# Patient Record
Sex: FEMALE | Race: WHITE | NOT HISPANIC OR LATINO | Employment: OTHER | URBAN - METROPOLITAN AREA
[De-identification: names, ages, dates, MRNs, and addresses within clinical notes are randomized per-mention and may not be internally consistent; named-entity substitution may affect disease eponyms.]

---

## 2020-12-15 ENCOUNTER — HOSPITAL ENCOUNTER (EMERGENCY)
Facility: CLINIC | Age: 65
Discharge: HOME | End: 2020-12-15
Attending: PHYSICIAN ASSISTANT | Admitting: PHYSICIAN ASSISTANT
Payer: COMMERCIAL

## 2020-12-15 ENCOUNTER — APPOINTMENT (OUTPATIENT)
Dept: ULTRASOUND IMAGING | Facility: CLINIC | Age: 65
End: 2020-12-15
Attending: PHYSICIAN ASSISTANT
Payer: COMMERCIAL

## 2020-12-15 VITALS
TEMPERATURE: 98.8 F | RESPIRATION RATE: 16 BRPM | DIASTOLIC BLOOD PRESSURE: 84 MMHG | HEIGHT: 62 IN | OXYGEN SATURATION: 99 % | WEIGHT: 156.53 LBS | SYSTOLIC BLOOD PRESSURE: 148 MMHG | BODY MASS INDEX: 28.8 KG/M2 | HEART RATE: 72 BPM

## 2020-12-15 DIAGNOSIS — L03.114 CELLULITIS OF LEFT UPPER EXTREMITY: ICD-10-CM

## 2020-12-15 PROCEDURE — 250N000013 HC RX MED GY IP 250 OP 250 PS 637: Performed by: PHYSICIAN ASSISTANT

## 2020-12-15 PROCEDURE — 99284 EMERGENCY DEPT VISIT MOD MDM: CPT | Mod: 25

## 2020-12-15 PROCEDURE — 93971 EXTREMITY STUDY: CPT | Mod: LT

## 2020-12-15 RX ORDER — CEPHALEXIN 500 MG/1
500 CAPSULE ORAL ONCE
Status: COMPLETED | OUTPATIENT
Start: 2020-12-15 | End: 2020-12-15

## 2020-12-15 RX ORDER — CEPHALEXIN 500 MG/1
500 CAPSULE ORAL 4 TIMES DAILY
Qty: 28 CAPSULE | Refills: 0 | Status: SHIPPED | OUTPATIENT
Start: 2020-12-15 | End: 2020-12-22

## 2020-12-15 RX ADMIN — CEPHALEXIN 500 MG: 500 CAPSULE ORAL at 14:57

## 2020-12-15 ASSESSMENT — ENCOUNTER SYMPTOMS: COLOR CHANGE: 1

## 2020-12-15 ASSESSMENT — MIFFLIN-ST. JEOR: SCORE: 1218.25

## 2020-12-15 NOTE — ED TRIAGE NOTES
Pt arrives with red bisi on R arm with red streak moving to interior bicep. Noticed it this morning. States feels warm and is swollen. ABCs intact.

## 2020-12-15 NOTE — ED AVS SNAPSHOT
[facility] Emergency Dept  [address]  [facility] [address]  Phone: [phone]  Fax: [phone]                                    Cher Pugh   MRN: [id number]    Department: [facility] Emergency Dept   Date of Visit: 12/15/2020           After Visit Summary Signature Page    I have received my discharge instructions, and my questions have been answered. I have discussed any challenges I see with this plan with the nurse or doctor.    ..........................................................................................................................................  Patient/Patient Representative Signature      ..........................................................................................................................................  Patient Representative Print Name and Relationship to Patient    ..................................................               ................................................  Date                                   Time    ..........................................................................................................................................  Reviewed by Signature/Title    ...................................................              ..............................................  Date                                               Time          22EPIC Rev 08/18

## 2020-12-15 NOTE — ED PROVIDER NOTES
"  History   Chief Complaint:  Wound Check    HPI   Cher Puhg is a 65 year old female who presents with a right arm wound check. The patient reports that this morning she noticed a red streak on her right forearm and later noticed a streaking red line.  It feels warm and swollen. No recent trauma or injuries reported. No recent IV placement. No recent cat or other bites.     Medications:    Estradiol    Past Medical History:    Patient denies any medical problems    Past surgical History:   Cholecystectomy   Cyst removal     Social History:  Presents to the ED alone     Review of Systems   Skin: Positive for color change.   All other systems reviewed and are negative.      Physical Exam     Patient Vitals for the past 24 hrs:   BP Temp Temp src Pulse Resp SpO2 Height Weight   12/15/20 1444 (!) 148/84 -- -- 72 -- -- -- --   12/15/20 1255 (!) 191/108 98.8  F (37.1  C) Oral 82 18 99 % 1.575 m (5' 2\") 71 kg (156 lb 8.4 oz)     Physical Exam  Constitutional: no distress.   CV: 2+ radial pulse, brisk distal cap refill  Pulm: No respiratory distress  MSK: Full ROM of right upper extremity without pain  Neurological: Alert, attentive  5/5  strength; sensation intact to RUE.   Skin: Skin is warm and dry. 4x2 cm area of erythema to the left forearm with a streaking erythematous line to the elbow as noted below.   Psych: Normal mood and affect               Emergency Department Course   Imaging:  US Upper Extremity Venous Duplex Left  No evidence of deep venous thrombosis.  Reading per radiology    Emergency Department Course:    Reviewed:  1256: I reviewed the patient's nursing notes, vitals, past medical records, Care Everywhere.     Assessments:  1301: I performed an exam of the patient as documented above.     Interventions:  1457: Keflex 500 mg Oral    Disposition:  The patient was discharged to home.     Impression & Plan   Medical Decision Making:  Cher Pugh is a 65 year old female who presents for " evaluation of skin redness as noted above.  No recent trauma, laceration, or other injury to the area.  There is no open wound.  Ultrasound of the left upper extremity unremarkable with no evidence of DVT.  Unclear source of erythema at this time, though given the area of redness and streaking redness, concern for cellulitis and lymphangitis.  We will treat with Keflex and area of redness outlined.   Close follow-up with her primary care provider.  I recommended wound recheck in 2 days.  She understands reasons to return including fevers, spreading redness, increasing pain, or any other concerning symptoms develop.  All questions and concerns addressed prior to discharge home.    Diagnosis:    ICD-10-CM    1. Cellulitis of left upper extremity  L03.114      Discharge Medications:  New Prescriptions    CEPHALEXIN (KEFLEX) 500 MG CAPSULE    Take 1 capsule (500 mg) by mouth 4 times daily for 7 days     Scribe Disclosure:  Michael CASILLAS, am serving as a scribe at 1:15 PM on 12/15/2020 to document services personally performed by Miroslava Garza PA-C based on my observations and the provider's statements to me.     [facility] EMERGENCY DEPT       Miroslava Garza PA-C  12/15/20 8406

## 2020-12-15 NOTE — DISCHARGE INSTRUCTIONS
*Elevate your arm as much as possible.  *Take medications as prescribed. Cephalexin as prescribed.    *Follow-up with your doctor for a recheck in the next 2 days.   *Return if you develop fever, rapid spread or pain/redness/warmth, worsening pain, faint or feel like you will faint or become worse in any way.      Discharge Instructions  Cellulitis    Cellulitis is an infection of the skin that occurs when bacteria enter the skin.   Symptoms are generally redness, swelling, warmth and pain.  Your infection appeared to be appropriate to treat at home with antibiotics.  However, sometimes your infection may be worse than it seemed at first, or may worsen with time. If you have new or worse symptoms, you may need to be seen again in the Emergency Department or by your primary doctor.    Return to the Emergency Department if:  The redness, pain, or swelling gets a lot worse.  If the red area was marked, return if it is red beyond the marked area.  You are unable to get your antibiotics, or are vomiting them up or you can t take them.  You are feeling more ill, weak or lightheaded.  You start to run a new fever (temperature >101).  Anything else about the infection worries or concerns you.  Treatment:  Start your antibiotics right away, and take them as prescribed. Be sure to finish the whole prescription, even if you are better.  Apply a heating pad, warm packs, or warm water soaks to the infected area for 15 minutes at a time, at least 3 times a day. Do not use a heating pad on your feet or legs if you have diabetes. Do not sleep with a heating pad on, since this can cause burns or skin injury.  Rest your injured area for at least 1-2 days. After that you may start using your extremity again as long as there is not too much pain.   Raise the injured area above the level of your heart as much as possible in the first 1-2 days.  Tylenol  (acetaminophen), Motrin  (ibuprofen), or Advil  (ibuprofen) may help may help reduce  "pain and fever and may help you feel more comfortable. Be sure to read and follow the package directions, and ask your doctor if you have questions.    Follow-up with your doctor:  Re-check in clinic within 2-3 days.  Probiotics: If you have been given an antibiotic, you may want to also take a probiotic pill or eat yogurt with live cultures. Probiotics have \"good bacteria\" to help your intestines stay healthy. Studies have shown that probiotics help prevent diarrhea and other intestine problems (including C. diff infection) when you take antibiotics. You can buy these without a prescription in the pharmacy section of the store.     If you were given a prescription for medicine here today, be sure to read all of the information (including the package insert) that comes with your prescription.  This will include important information about the medicine, its side effects, and any warnings that you need to know about.  The pharmacist who fills the prescription can provide more information and answer questions you may have about the medicine.  If you have questions or concerns that the pharmacist cannot address, please call or return to the Emergency Department.   "

## 2024-03-02 ENCOUNTER — HOSPITAL ENCOUNTER (EMERGENCY)
Facility: CLINIC | Age: 69
Discharge: HOME | End: 2024-03-02
Attending: EMERGENCY MEDICINE | Admitting: EMERGENCY MEDICINE
Payer: MEDICARE

## 2024-03-02 ENCOUNTER — APPOINTMENT (OUTPATIENT)
Dept: CT IMAGING | Facility: CLINIC | Age: 69
End: 2024-03-02
Attending: EMERGENCY MEDICINE
Payer: MEDICARE

## 2024-03-02 ENCOUNTER — APPOINTMENT (OUTPATIENT)
Dept: MRI IMAGING | Facility: CLINIC | Age: 69
End: 2024-03-02
Attending: EMERGENCY MEDICINE
Payer: MEDICARE

## 2024-03-02 ENCOUNTER — APPOINTMENT (OUTPATIENT)
Dept: GENERAL RADIOLOGY | Facility: CLINIC | Age: 69
End: 2024-03-02
Attending: EMERGENCY MEDICINE
Payer: MEDICARE

## 2024-03-02 VITALS
TEMPERATURE: 98.7 F | BODY MASS INDEX: 29.49 KG/M2 | RESPIRATION RATE: 18 BRPM | DIASTOLIC BLOOD PRESSURE: 82 MMHG | HEIGHT: 62 IN | WEIGHT: 160.27 LBS | HEART RATE: 80 BPM | OXYGEN SATURATION: 97 % | SYSTOLIC BLOOD PRESSURE: 144 MMHG

## 2024-03-02 DIAGNOSIS — R03.0 ELEVATED BLOOD PRESSURE READING: ICD-10-CM

## 2024-03-02 DIAGNOSIS — R91.8 PULMONARY NODULES: ICD-10-CM

## 2024-03-02 DIAGNOSIS — R07.9 CHEST PAIN, UNSPECIFIED TYPE: ICD-10-CM

## 2024-03-02 DIAGNOSIS — R20.2 PARESTHESIA: ICD-10-CM

## 2024-03-02 LAB
ANION GAP SERPL CALCULATED.3IONS-SCNC: 13 MMOL/L (ref 7–15)
BASOPHILS # BLD AUTO: 0.1 10E3/UL (ref 0–0.2)
BASOPHILS NFR BLD AUTO: 1 %
BUN SERPL-MCNC: 13.4 MG/DL (ref 8–23)
CALCIUM SERPL-MCNC: 9.5 MG/DL (ref 8.8–10.2)
CHLORIDE SERPL-SCNC: 103 MMOL/L (ref 98–107)
CREAT SERPL-MCNC: 0.74 MG/DL (ref 0.51–0.95)
D DIMER PPP FEU-MCNC: <0.27 UG/ML FEU (ref 0–0.5)
DEPRECATED HCO3 PLAS-SCNC: 24 MMOL/L (ref 22–29)
EGFRCR SERPLBLD CKD-EPI 2021: 89 ML/MIN/1.73M2
EOSINOPHIL # BLD AUTO: 0 10E3/UL (ref 0–0.7)
EOSINOPHIL NFR BLD AUTO: 0 %
ERYTHROCYTE [DISTWIDTH] IN BLOOD BY AUTOMATED COUNT: 13 % (ref 10–15)
GLUCOSE SERPL-MCNC: 109 MG/DL (ref 70–99)
HCT VFR BLD AUTO: 41 % (ref 35–47)
HGB BLD-MCNC: 13.6 G/DL (ref 11.7–15.7)
HOLD SPECIMEN: NORMAL
HOLD SPECIMEN: NORMAL
IMM GRANULOCYTES # BLD: 0 10E3/UL
IMM GRANULOCYTES NFR BLD: 0 %
LYMPHOCYTES # BLD AUTO: 1.9 10E3/UL (ref 0.8–5.3)
LYMPHOCYTES NFR BLD AUTO: 22 %
MCH RBC QN AUTO: 31.4 PG (ref 26.5–33)
MCHC RBC AUTO-ENTMCNC: 33.2 G/DL (ref 31.5–36.5)
MCV RBC AUTO: 95 FL (ref 78–100)
MONOCYTES # BLD AUTO: 0.5 10E3/UL (ref 0–1.3)
MONOCYTES NFR BLD AUTO: 6 %
NEUTROPHILS # BLD AUTO: 6 10E3/UL (ref 1.6–8.3)
NEUTROPHILS NFR BLD AUTO: 71 %
NRBC # BLD AUTO: 0 10E3/UL
NRBC BLD AUTO-RTO: 0 /100
PLATELET # BLD AUTO: 332 10E3/UL (ref 150–450)
POTASSIUM SERPL-SCNC: 4 MMOL/L (ref 3.4–5.3)
RBC # BLD AUTO: 4.33 10E6/UL (ref 3.8–5.2)
SODIUM SERPL-SCNC: 140 MMOL/L (ref 135–145)
TROPONIN T SERPL HS-MCNC: <6 NG/L
WBC # BLD AUTO: 8.5 10E3/UL (ref 4–11)

## 2024-03-02 PROCEDURE — 71260 CT THORAX DX C+: CPT

## 2024-03-02 PROCEDURE — 85025 COMPLETE CBC W/AUTO DIFF WBC: CPT | Performed by: EMERGENCY MEDICINE

## 2024-03-02 PROCEDURE — 70553 MRI BRAIN STEM W/O & W/DYE: CPT

## 2024-03-02 PROCEDURE — 93005 ELECTROCARDIOGRAM TRACING: CPT

## 2024-03-02 PROCEDURE — 85379 FIBRIN DEGRADATION QUANT: CPT | Performed by: EMERGENCY MEDICINE

## 2024-03-02 PROCEDURE — 70496 CT ANGIOGRAPHY HEAD: CPT

## 2024-03-02 PROCEDURE — 36415 COLL VENOUS BLD VENIPUNCTURE: CPT | Performed by: EMERGENCY MEDICINE

## 2024-03-02 PROCEDURE — 80048 BASIC METABOLIC PNL TOTAL CA: CPT | Performed by: EMERGENCY MEDICINE

## 2024-03-02 PROCEDURE — 250N000009 HC RX 250: Performed by: EMERGENCY MEDICINE

## 2024-03-02 PROCEDURE — 255N000002 HC RX 255 OP 636: Performed by: EMERGENCY MEDICINE

## 2024-03-02 PROCEDURE — 250N000011 HC RX IP 250 OP 636: Performed by: EMERGENCY MEDICINE

## 2024-03-02 PROCEDURE — 84484 ASSAY OF TROPONIN QUANT: CPT | Performed by: EMERGENCY MEDICINE

## 2024-03-02 PROCEDURE — A9585 GADOBUTROL INJECTION: HCPCS | Performed by: EMERGENCY MEDICINE

## 2024-03-02 PROCEDURE — 71046 X-RAY EXAM CHEST 2 VIEWS: CPT

## 2024-03-02 PROCEDURE — 99285 EMERGENCY DEPT VISIT HI MDM: CPT | Mod: 25

## 2024-03-02 RX ORDER — GADOBUTROL 604.72 MG/ML
7.5 INJECTION INTRAVENOUS ONCE
Status: COMPLETED | OUTPATIENT
Start: 2024-03-02 | End: 2024-03-02

## 2024-03-02 RX ORDER — IOPAMIDOL 755 MG/ML
500 INJECTION, SOLUTION INTRAVASCULAR ONCE
Status: COMPLETED | OUTPATIENT
Start: 2024-03-02 | End: 2024-03-02

## 2024-03-02 RX ADMIN — GADOBUTROL 7.5 ML: 604.72 INJECTION INTRAVENOUS at 19:24

## 2024-03-02 RX ADMIN — SODIUM CHLORIDE 80 ML: 9 INJECTION, SOLUTION INTRAVENOUS at 20:34

## 2024-03-02 RX ADMIN — IOPAMIDOL 100 ML: 755 INJECTION, SOLUTION INTRAVENOUS at 20:34

## 2024-03-02 ASSESSMENT — COLUMBIA-SUICIDE SEVERITY RATING SCALE - C-SSRS
6. HAVE YOU EVER DONE ANYTHING, STARTED TO DO ANYTHING, OR PREPARED TO DO ANYTHING TO END YOUR LIFE?: NO
1. IN THE PAST MONTH, HAVE YOU WISHED YOU WERE DEAD OR WISHED YOU COULD GO TO SLEEP AND NOT WAKE UP?: NO
2. HAVE YOU ACTUALLY HAD ANY THOUGHTS OF KILLING YOURSELF IN THE PAST MONTH?: NO

## 2024-03-02 ASSESSMENT — ACTIVITIES OF DAILY LIVING (ADL)
ADLS_ACUITY_SCORE: 35

## 2024-03-02 NOTE — ED TRIAGE NOTES
Flu symptoms for a week. Out shopping today and got sudden sharp chest pain. Took 2 baby asa. Reports tingling to left hand. Pain subsided.

## 2024-03-03 NOTE — DISCHARGE INSTRUCTIONS
An outpatient stress test was ordered for you.  Scheduled for 11am on Monday.  A cardiology consult was ordered to follow up this test    Discharge Instructions  Chest Pain    You have been seen today for chest pain or discomfort.  At this time, your provider has found no signs that your chest pain is due to a serious or life-threatening condition, (or you have declined more testing and/or admission to the hospital). However, sometimes there is a serious problem that does not show up right away. Your evaluation today may not be complete and you may need further testing and evaluation.     Generally, every Emergency Department visit should have a follow-up clinic visit with either a primary or a specialty clinic/provider. Please follow-up as instructed by your emergency provider today.  Return to the Emergency Department if:  Your chest pain changes, gets worse, starts to happen more often, or comes with less activity.  You are newly short of breath.  You get very weak or tired.  You pass out or faint.  You have any new symptoms, like fever, cough, numb legs, or you cough up blood.  You have anything else that worries you.    Until you follow-up with your regular provider, please do the following:  Take one aspirin daily unless you have an allergy or are told not to by your provider.  If a stress test appointment has been made, go to the appointment.  If you have questions, contact your regular provider.  Follow-up with your regular provider/clinic as directed; this is very important.    If you were given a prescription for medicine here today, be sure to read all of the information (including the package insert) that comes with your prescription.  This will include important information about the medicine, its side effects, and any warnings that you need to know about.  The pharmacist who fills the prescription can provide more information and answer questions you may have about the medicine.  If you have questions  or concerns that the pharmacist cannot address, please call or return to the Emergency Department.       Remember that you can always come back to the Emergency Department if you are not able to see your regular provider in the amount of time listed above, if you get any new symptoms, or if there is anything that worries you.

## 2024-03-03 NOTE — ED PROVIDER NOTES
"  History     Chief Complaint:  Chest Pain       HPI   Cher Pugh is a 68 year old female who present to the ED for an evaluation of chest pain. The patient reports that she developed a chest pain today. She notes that she was out shopping when she got a sudden sharp centralized chest pain that subsided after 5 minute.  She noted associated  tingling on her left fingers and feeling fatigue for the past week. She endorses that she has had shoulder pain for some time from her previous strenuous work but this is unchanged.  She denies any fever, cough, congestion, headache, neck pain and abdominal pain. No urinary symptoms. No vision and speech change. No  leg pain and leg swelling. No extremity weakness. No difficulties walking.  Feeling fatigued over the past week but no antecedent chest pain or shortness of breath.  No fevers or cough.  No abdominal pain or vomiting.  No falls.        Independent Historian:    Patient, as per HPI.     Review of External Notes:  None.       Medications:    The patient is currently on no regular medications.    Past Medical History:    No past medical history.    Past Surgical History:   Cholecystectomy   Cyst removal     Physical Exam   Patient Vitals for the past 24 hrs:   BP Temp Temp src Pulse Resp SpO2 Height Weight   03/02/24 2200 (!) 144/82 -- -- 80 18 97 % -- --   03/02/24 1553 (!) 178/99 98.7  F (37.1  C) Temporal 102 18 96 % 1.575 m (5' 2\") 72.7 kg (160 lb 4.4 oz)      Physical Exam  Gen: alert  Neck: normal ROM  CV: RRR, 2+ distal pulses in all 4 extremities  Chest: no tenderness over the chest wall  Pulm: breath sounds equal, lungs clear  Abd: Soft, nontender  Back: no evidence of injury, no midline spinal tenderness  MSK: no lower extremity edema, no calf tenderness  Skin: no rash over chest wall  Neuro: alert, appropriate conversation and interaction, speech fluent, PERRL, EOMI, CN 2-7 and 9-12 intact, 5/5 grasp BUE, 5/5 elbow flexion and extension BUE, 5/5 " shoulder abduction BUE, 5/5 hip flexion, knee flexion, knee extension, plantar and dorsiflexion BLE, no pronator drift, normal gait,  no dysdiadochokinesia, normal finger-nose-finger testing       Emergency Department Course   ECG  ECG taken at 1627, ECG read at 1820  Normal sinus rhythm. Normal ECG   Rate 79 bpm. VA interval 166 ms. QRS duration 86 ms. QT/QTc 396/454 ms. P-R-T axes 57 24 43.    Imaging:  CTA Head Neck with Contrast   Final Result   IMPRESSION:    HEAD CTA:    1.  Normal CTA [place].      NECK CTA:   1.  Normal neck CTA.      CT Aortic Survey w Contrast   Final Result   IMPRESSION:   1.  No acute thoracic or abdominal aortic abnormality. No dissection identified.   2.  A few indeterminate small pulmonary nodules, see below for follow-up.   3.  No other acute abnormality identified.      Recommendations for an incidental lung nodule = or > 6mm to 8mm:   Low-Risk Patient: Initial follow-up CT at 6-12 months, then consider CT at 18-24 months if no change.   High-Risk Patient: Initial follow-up CT at 6-12 months, then CT at 18-24 months if no change.      *Low Risk: Minimal or absent history of smoking or other known risk factors.   *Nonsolid (ground-glass) or partly solid nodules may require longer follow-up to exclude indolent adenocarcinoma.   *Recommendations based on Guidelines for the Management of Incidental Pulmonary Nodules Detected at CT: From the Fleischner Society 2017, Radiology 2017.          MR Brain w/o & w Contrast   Final Result   IMPRESSION:   1.  No acute abnormality.   2.  Scattered nonspecific white matter T2 hyperintensities likely represent chronic small vessel ischemic change.      Chest XR,  PA & LAT   Final Result   IMPRESSION: Negative chest.          Laboratory:  Labs Ordered and Resulted from Time of ED Arrival to Time of ED Departure   BASIC METABOLIC PANEL - Abnormal       Result Value    Sodium 140      Potassium 4.0      Chloride 103      Carbon Dioxide (CO2)  24      Anion Gap 13      Urea Nitrogen 13.4      Creatinine 0.74      GFR Estimate 89      Calcium 9.5      Glucose 109 (*)    TROPONIN T, HIGH SENSITIVITY - Normal    Troponin T, High Sensitivity <6     D DIMER QUANTITATIVE - Normal    D-Dimer Quantitative <0.27     CBC WITH PLATELETS AND DIFFERENTIAL    WBC Count 8.5      RBC Count 4.33      Hemoglobin 13.6      Hematocrit 41.0      MCV 95      MCH 31.4      MCHC 33.2      RDW 13.0      Platelet Count 332      % Neutrophils 71      % Lymphocytes 22      % Monocytes 6      % Eosinophils 0      % Basophils 1      % Immature Granulocytes 0      NRBCs per 100 WBC 0      Absolute Neutrophils 6.0      Absolute Lymphocytes 1.9      Absolute Monocytes 0.5      Absolute Eosinophils 0.0      Absolute Basophils 0.1      Absolute Immature Granulocytes 0.0      Absolute NRBCs 0.0          Emergency Department Course & Assessments:    Interventions:  Medications   gadobutrol (GADAVIST) injection 7.5 mL (7.5 mLs Intravenous $Given 3/2/24 1924)   CT Scan Flush (80 mLs Intravenous $Given 3/2/24 2034)   iopamidol (ISOVUE-370) solution 500 mL (100 mLs Intravenous $Given 3/2/24 2034)        Independent Interpretation (X-rays, CTs, rhythm strip):  CXR- No infiltrate or effusion    Assessments/ Consultations/Discussion of Management or Tests:  ED Course as of 03/02/24 2240   Sat Mar 02, 2024   1812 CXR independent interpretation- no infiltrate, no effusion, nl mediastinum   1830 I have          Social Determinants of Health affecting care:  None     Disposition:  The patient was discharged.    Impression & Plan    Medical Decision Making:  Patient presents for episode of sharp anterior chest pain with associated arm tingling.  Symptoms resolved prior to arrival.  Here, patient has normal neurologic exam.  EKG shows sinus rhythm without ischemic change.  Troponin negative.  PE considered low risk for PE and D-dimer negative.  I felt this is sufficient to exclude PE.  Given sudden onset  significant chest discomfort with arm tingling, CT of the aorta was obtained which was negative for dissection or aneurysm.  CTA head and neck were obtained to evaluate for dissection or vascular occlusion.  This was negative.  MRI of brain was negative for infarction.  Incidental finding of pulmonary nodules from chest CT were discussed with patient she will follow-up with primary care with these.  No smoking history.  I discussed with patient detail that we did not find a discrete cause for her symptoms today and she needs to return if she has recurrent chest pain or shortness of breath.  Outpatient stress testing recommended.  Stress test ordered for Monday morning with cardiology consult also placed.  Patient expressed understanding of follow-up instructions and need to return to emergency department for worsening symptoms.  Initial blood pressure elevation noted.  This improved without intervention recheck.  Patient does not carry diagnosis of hypertension.  I discussed the need to establish primary care for blood pressure recheck and ongoing care.  Discharge home      Diagnosis:    ICD-10-CM    1. Chest pain, unspecified type  R07.9       2. Paresthesia  R20.2       3. Pulmonary nodules  R91.8            Discharge Medications: No new prescription  New Prescriptions    No medications on file          Scribe Disclosure:  I, Lizzeth Gonzalez, am serving as a scribe at 6:44 PM on 3/2/2024 to document services personally performed by Blanca Guzman based on my observations and the provider's statements to me.  3/2/2024   Blanca Guzman Kristi Jo Schneider, MD  03/03/24 0307

## 2024-03-04 ENCOUNTER — HOSPITAL ENCOUNTER (OUTPATIENT)
Dept: CARDIOLOGY | Facility: CLINIC | Age: 69
Discharge: HOME | End: 2024-03-04
Attending: EMERGENCY MEDICINE | Admitting: EMERGENCY MEDICINE
Payer: MEDICARE

## 2024-03-04 DIAGNOSIS — R07.9 CHEST PAIN, UNSPECIFIED TYPE: ICD-10-CM

## 2024-03-04 LAB
ATRIAL RATE - MUSE: 79 BPM
DIASTOLIC BLOOD PRESSURE - MUSE: NORMAL MMHG
INTERPRETATION ECG - MUSE: NORMAL
P AXIS - MUSE: 57 DEGREES
PR INTERVAL - MUSE: 166 MS
QRS DURATION - MUSE: 86 MS
QT - MUSE: 396 MS
QTC - MUSE: 454 MS
R AXIS - MUSE: 24 DEGREES
SYSTOLIC BLOOD PRESSURE - MUSE: NORMAL MMHG
T AXIS - MUSE: 43 DEGREES
VENTRICULAR RATE- MUSE: 79 BPM

## 2024-03-04 PROCEDURE — 93350 STRESS TTE ONLY: CPT | Mod: TC

## 2024-03-04 PROCEDURE — 93018 CV STRESS TEST I&R ONLY: CPT | Performed by: INTERNAL MEDICINE

## 2024-03-04 PROCEDURE — 93325 DOPPLER ECHO COLOR FLOW MAPG: CPT | Mod: 26 | Performed by: INTERNAL MEDICINE

## 2024-03-04 PROCEDURE — 93321 DOPPLER ECHO F-UP/LMTD STD: CPT | Mod: 26 | Performed by: INTERNAL MEDICINE

## 2024-03-04 PROCEDURE — 93016 CV STRESS TEST SUPVJ ONLY: CPT | Performed by: INTERNAL MEDICINE

## 2024-03-04 PROCEDURE — 93350 STRESS TTE ONLY: CPT | Mod: 26 | Performed by: INTERNAL MEDICINE

## 2024-03-04 PROCEDURE — 93325 DOPPLER ECHO COLOR FLOW MAPG: CPT | Mod: TC

## 2024-03-06 ENCOUNTER — OFFICE VISIT (OUTPATIENT)
Dept: CARDIOLOGY | Facility: CLINIC | Age: 69
End: 2024-03-06
Attending: EMERGENCY MEDICINE
Payer: MEDICARE

## 2024-03-06 VITALS
BODY MASS INDEX: 29.66 KG/M2 | HEIGHT: 62 IN | OXYGEN SATURATION: 97 % | HEART RATE: 73 BPM | WEIGHT: 161.2 LBS | DIASTOLIC BLOOD PRESSURE: 76 MMHG | SYSTOLIC BLOOD PRESSURE: 134 MMHG

## 2024-03-06 DIAGNOSIS — R07.9 CHEST PAIN, UNSPECIFIED TYPE: ICD-10-CM

## 2024-03-06 PROCEDURE — 99204 OFFICE O/P NEW MOD 45 MIN: CPT | Performed by: INTERNAL MEDICINE

## 2024-03-06 NOTE — PROGRESS NOTES
HPI and Plan:   Ms Pugh is a very pleasant 68-year-old female who recently had an episode of chest discomfort that lasted for a few minutes while she was walking.  She went to the ER where she was ruled out for acute coronary syndrome.  EKG shows sinus rhythm high since her troponin was less than 6, she underwent CT chest to rule out aortic dissection there was no aortic dissection, no coronary calcification was noted.  She also had some tingling sensation and underwent MRI brain without any acute pathology noted.  She subsequently underwent exercise stress echocardiogram where she exercised just under 8 minutes achieving 9.7 METS.  Overall this was normal stress echocardiogram without any evidence of inducible ischemia.  There was transient left bundle branch block noted that could be rate dependent.  Today patient is coming to cardiology clinic accompanied by her daughter.  Overall she feels well.  She tells me that she had this achiness in the chest while walking a few days ago that lasted only for 2 minutes and it went away.  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  She does not use any tobacco.      Assessment and plan  Single episode of chest discomfort.  Short duration.  Was ruled out for acute coronary syndrome.  CT chest without any coronary calcification.  Exercise stress echocardiogram normal without any evidence of inducible ischemia.  Discussed sensitivity and specifically of stress testing.  Discussed at length with patient and her daughter that overall there is no evidence of significant coronary disease.  We discussed option of traditional coronary angiogram but risk benefits do not favor as symptom was only transient and subsequent cardiac testing has been normal.  Discussed warning symptoms and signs.    Recommendations  Overall  "cardiac status wise stable, exercise stress echocardiogram normal.  No additional cardiac testing indicated at this time.  Discussed warning symptoms and signs.  Recommend follow-up with PCP for routine health care and follow-up in cardiology clinic on as-needed basis.          No orders of the defined types were placed in this encounter.      No orders of the defined types were placed in this encounter.      There are no discontinued medications.      Encounter Diagnosis   Name Primary?    Chest pain, unspecified type        CURRENT MEDICATIONS:  No current outpatient medications on file.       ALLERGIES   No Known Allergies    PAST MEDICAL HISTORY:  History reviewed. No pertinent past medical history.    PAST SURGICAL HISTORY:  History reviewed. No pertinent surgical history.    FAMILY HISTORY:  History reviewed. No pertinent family history.    SOCIAL HISTORY:  Social History     Socioeconomic History    Marital status:      Spouse name: None    Number of children: None    Years of education: None    Highest education level: None   Tobacco Use    Smoking status: Never    Smokeless tobacco: Never   Substance and Sexual Activity    Alcohol use: Never    Drug use: Never       Review of Systems:  Skin:          Eyes:         ENT:         Respiratory:  Negative       Cardiovascular:  Negative      Gastroenterology:        Genitourinary:         Musculoskeletal:         Neurologic:         Psychiatric:         Heme/Lymph/Imm:         Endocrine:           Physical Exam:  Vitals: /76 (BP Location: Right arm, Patient Position: Sitting, Cuff Size: Adult Regular)   Pulse 73   Ht 1.575 m (5' 2\")   Wt 73.1 kg (161 lb 3.2 oz)   SpO2 97%   BMI 29.48 kg/m      General patient appears comfortable  Neck normal JVP  Cardiovascular system S1-S2 normal no murmur rub or gallop  Respiratory system clear to auscultation  Extremities no edema    CC  Blanca Guzman MD  [facility]  [id number] [place] [address]

## 2024-03-06 NOTE — LETTER
3/6/2024    Physician No Ref-Primary  No address on file    RE: Cher Pugh       Dear Colleague,     I had the pleasure of seeing Cher Pugh in the [facility].  HPI and Plan:   Ms Pugh is a very pleasant 68-year-old female who recently had an episode of chest discomfort that lasted for a few minutes while she was walking.  She went to the ER where she was ruled out for acute coronary syndrome.  EKG shows sinus rhythm high since her troponin was less than 6, she underwent CT chest to rule out aortic dissection there was no aortic dissection, no coronary calcification was noted.  She also had some tingling sensation and underwent MRI brain without any acute pathology noted.  She subsequently underwent exercise stress echocardiogram where she exercised just under 8 minutes achieving 9.7 METS.  Overall this was normal stress echocardiogram without any evidence of inducible ischemia.  There was transient left bundle branch block noted that could be rate dependent.  Today patient is coming to cardiology clinic accompanied by her daughter.  Overall she feels well.  She tells me that she had this achiness in the chest while walking a few days ago that lasted only for 2 minutes and it went away.  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  She does not use any tobacco.      Assessment and plan  Single episode of chest discomfort.  Short duration.  Was ruled out for acute coronary syndrome.  CT chest without any coronary calcification.  Exercise stress echocardiogram normal without any evidence of inducible ischemia.  Discussed sensitivity and specifically of stress testing.  Discussed at length with patient and her daughter that overall there is no evidence of significant coronary disease.  We discussed option of traditional  "coronary angiogram but risk benefits do not favor as symptom was only transient and subsequent cardiac testing has been normal.  Discussed warning symptoms and signs.    Recommendations  Overall cardiac status wise stable, exercise stress echocardiogram normal.  No additional cardiac testing indicated at this time.  Discussed warning symptoms and signs.  Recommend follow-up with PCP for routine health care and follow-up in cardiology clinic on as-needed basis.          No orders of the defined types were placed in this encounter.      No orders of the defined types were placed in this encounter.      There are no discontinued medications.      Encounter Diagnosis   Name Primary?    Chest pain, unspecified type        CURRENT MEDICATIONS:  No current outpatient medications on file.       ALLERGIES   No Known Allergies    PAST MEDICAL HISTORY:  History reviewed. No pertinent past medical history.    PAST SURGICAL HISTORY:  History reviewed. No pertinent surgical history.    FAMILY HISTORY:  History reviewed. No pertinent family history.    SOCIAL HISTORY:  Social History     Socioeconomic History    Marital status:      Spouse name: None    Number of children: None    Years of education: None    Highest education level: None   Tobacco Use    Smoking status: Never    Smokeless tobacco: Never   Substance and Sexual Activity    Alcohol use: Never    Drug use: Never       Review of Systems:  Skin:          Eyes:         ENT:         Respiratory:  Negative       Cardiovascular:  Negative      Gastroenterology:        Genitourinary:         Musculoskeletal:         Neurologic:         Psychiatric:         Heme/Lymph/Imm:         Endocrine:           Physical Exam:  Vitals: /76 (BP Location: Right arm, Patient Position: Sitting, Cuff Size: Adult Regular)   Pulse 73   Ht 1.575 m (5' 2\")   Wt 73.1 kg (161 lb 3.2 oz)   SpO2 97%   BMI 29.48 kg/m      General patient appears comfortable  Neck normal " JVP  Cardiovascular system S1-S2 normal no murmur rub or gallop  Respiratory system clear to auscultation  Extremities no edema    CC  Blancahomar Guzman MD  [facility]  [address] [place] [address]        Thank you for allowing me to participate in the care of your patient.      Sincerely,     Charlie Diaz MD     [facility]

## 2024-05-26 ENCOUNTER — HEALTH MAINTENANCE LETTER (OUTPATIENT)
Age: 69
End: 2024-05-26

## 2024-10-13 ENCOUNTER — APPOINTMENT (OUTPATIENT)
Dept: GENERAL RADIOLOGY | Facility: CLINIC | Age: 69
End: 2024-10-13
Attending: PHYSICIAN ASSISTANT
Payer: MEDICARE

## 2024-10-13 ENCOUNTER — HOSPITAL ENCOUNTER (EMERGENCY)
Facility: CLINIC | Age: 69
Discharge: HOME | End: 2024-10-13
Attending: PHYSICIAN ASSISTANT | Admitting: PHYSICIAN ASSISTANT
Payer: MEDICARE

## 2024-10-13 VITALS
OXYGEN SATURATION: 97 % | WEIGHT: 160.27 LBS | TEMPERATURE: 97.9 F | BODY MASS INDEX: 28.4 KG/M2 | HEART RATE: 82 BPM | HEIGHT: 63 IN | SYSTOLIC BLOOD PRESSURE: 145 MMHG | DIASTOLIC BLOOD PRESSURE: 87 MMHG | RESPIRATION RATE: 16 BRPM

## 2024-10-13 DIAGNOSIS — R20.2 PARESTHESIA OF LEFT ARM: ICD-10-CM

## 2024-10-13 DIAGNOSIS — M75.32 CALCIFIC TENDINITIS OF LEFT SHOULDER REGION: ICD-10-CM

## 2024-10-13 DIAGNOSIS — R07.89 ATYPICAL CHEST PAIN: ICD-10-CM

## 2024-10-13 LAB
ANION GAP SERPL CALCULATED.3IONS-SCNC: 14 MMOL/L (ref 7–15)
BASOPHILS # BLD AUTO: 0.1 10E3/UL (ref 0–0.2)
BASOPHILS NFR BLD AUTO: 1 %
BUN SERPL-MCNC: 9.5 MG/DL (ref 8–23)
CALCIUM SERPL-MCNC: 9.2 MG/DL (ref 8.8–10.4)
CHLORIDE SERPL-SCNC: 102 MMOL/L (ref 98–107)
CREAT SERPL-MCNC: 0.62 MG/DL (ref 0.51–0.95)
D DIMER PPP FEU-MCNC: 0.29 UG/ML FEU (ref 0–0.5)
EGFRCR SERPLBLD CKD-EPI 2021: >90 ML/MIN/1.73M2
EOSINOPHIL # BLD AUTO: 0.1 10E3/UL (ref 0–0.7)
EOSINOPHIL NFR BLD AUTO: 2 %
ERYTHROCYTE [DISTWIDTH] IN BLOOD BY AUTOMATED COUNT: 13.2 % (ref 10–15)
GLUCOSE SERPL-MCNC: 115 MG/DL (ref 70–99)
HCO3 SERPL-SCNC: 22 MMOL/L (ref 22–29)
HCT VFR BLD AUTO: 41.6 % (ref 35–47)
HGB BLD-MCNC: 13.4 G/DL (ref 11.7–15.7)
HOLD SPECIMEN: NORMAL
IMM GRANULOCYTES # BLD: 0 10E3/UL
IMM GRANULOCYTES NFR BLD: 0 %
LYMPHOCYTES # BLD AUTO: 1.8 10E3/UL (ref 0.8–5.3)
LYMPHOCYTES NFR BLD AUTO: 30 %
MCH RBC QN AUTO: 30.9 PG (ref 26.5–33)
MCHC RBC AUTO-ENTMCNC: 32.2 G/DL (ref 31.5–36.5)
MCV RBC AUTO: 96 FL (ref 78–100)
MONOCYTES # BLD AUTO: 0.3 10E3/UL (ref 0–1.3)
MONOCYTES NFR BLD AUTO: 6 %
NEUTROPHILS # BLD AUTO: 3.5 10E3/UL (ref 1.6–8.3)
NEUTROPHILS NFR BLD AUTO: 61 %
NRBC # BLD AUTO: 0 10E3/UL
NRBC BLD AUTO-RTO: 0 /100
PLATELET # BLD AUTO: 311 10E3/UL (ref 150–450)
POTASSIUM SERPL-SCNC: 5.2 MMOL/L (ref 3.4–5.3)
RBC # BLD AUTO: 4.34 10E6/UL (ref 3.8–5.2)
SODIUM SERPL-SCNC: 138 MMOL/L (ref 135–145)
TROPONIN T SERPL HS-MCNC: <6 NG/L
WBC # BLD AUTO: 5.8 10E3/UL (ref 4–11)

## 2024-10-13 PROCEDURE — 80048 BASIC METABOLIC PNL TOTAL CA: CPT | Performed by: PHYSICIAN ASSISTANT

## 2024-10-13 PROCEDURE — 71046 X-RAY EXAM CHEST 2 VIEWS: CPT

## 2024-10-13 PROCEDURE — 73030 X-RAY EXAM OF SHOULDER: CPT | Mod: LT

## 2024-10-13 PROCEDURE — 82310 ASSAY OF CALCIUM: CPT | Performed by: PHYSICIAN ASSISTANT

## 2024-10-13 PROCEDURE — 85025 COMPLETE CBC W/AUTO DIFF WBC: CPT | Performed by: PHYSICIAN ASSISTANT

## 2024-10-13 PROCEDURE — 36415 COLL VENOUS BLD VENIPUNCTURE: CPT | Performed by: PHYSICIAN ASSISTANT

## 2024-10-13 PROCEDURE — 85379 FIBRIN DEGRADATION QUANT: CPT | Performed by: PHYSICIAN ASSISTANT

## 2024-10-13 PROCEDURE — 99285 EMERGENCY DEPT VISIT HI MDM: CPT | Mod: 25 | Performed by: PHYSICIAN ASSISTANT

## 2024-10-13 PROCEDURE — 93005 ELECTROCARDIOGRAM TRACING: CPT | Performed by: PHYSICIAN ASSISTANT

## 2024-10-13 PROCEDURE — 84484 ASSAY OF TROPONIN QUANT: CPT | Performed by: PHYSICIAN ASSISTANT

## 2024-10-13 RX ORDER — METHYLPREDNISOLONE 4 MG/1
TABLET ORAL
Qty: 21 TABLET | Refills: 0 | Status: SHIPPED | OUTPATIENT
Start: 2024-10-13

## 2024-10-13 ASSESSMENT — ACTIVITIES OF DAILY LIVING (ADL)
ADLS_ACUITY_SCORE: 35

## 2024-10-13 NOTE — ED PROVIDER NOTES
"  Emergency Department Note      History of Present Illness     Chief Complaint   Chest Pain      HPI   Cher Pugh is a 69 year old female otherwise healthy who presents for evaluation of chest pain.  The patient notes some left-sided chest pain to the left upper chest area that radiates to the back.  This started yesterday.  She also notes a tingling going down her left arm\".  She notes the pain/pressure is dull.  She can still feel the arm and move it normal.  She feels a little more winded.  Denies anything that makes the pain or symptoms in the arm better or worse including inspiration, activity, positional change.  She notes she has had intermittent symptoms on some level since she was seen for the same thing back in March but it has not been this bad since March.  Sometimes the symptoms are gone entirely.  She notes she went hiking recently and did not really notice the pressure pain worsening but did feel little bit short of breath.  No leg swelling or calf pain.  Denies cough or fever.  She denies any numbness or tingling to the face torso or legs, no vision changes, dizziness or headache.  No bowel or bladder changes.  No recent falls or injuries.  She has not taken anything for the symptoms.  She has not seen primary about them but did see a cardiologist and had a stress test and cardiac workup reportedly that was negative.    Independent Historian   None    Review of External Notes   I reviewed Dr. Diaz cardiology note from 3/6/2024, I note symptoms not felt to be cardiac stress echo normal CT without findings of coronary calcification.  Also I reviewed Dr. Wells ED visit from 3/2/24 patient seen for similar presentation and underwent MRI of the brain CTA of the head and neck as well as CT aortic survey which were quite reassuring.    Past Medical History     Medical History and Problem List   No past medical history on file.    Medications   methylPREDNISolone (MEDROL DOSEPAK) 4 MG tablet " "therapy pack        Surgical History   No past surgical history on file.    Physical Exam     Patient Vitals for the past 24 hrs:   BP Temp Temp src Pulse Resp SpO2 Height Weight   10/13/24 1136 (!) 145/87 -- -- -- 16 -- -- --   10/13/24 0856 (!) 165/92 97.9  F (36.6  C) Temporal 82 16 97 % 1.6 m (5' 3\") 72.7 kg (160 lb 4.4 oz)     Physical Exam  General: Awake, alert, non-toxic.  Head:  Scalp is NC/AT  Eyes:  Conjunctiva normal, PERRL  ENT:  The external nose and ears are normal.   Neck:  Normal range of motion without rigidity.  CV:  Regular rate and rhythm    No pathologic murmur, rubs, or gallops.  Resp:  Breath sounds are clear bilaterally    Non-labored, no retractions or accessory muscle use  Abdomen: Abdomen is soft, no distension, no tenderness, no masses, no CVA ttp  MS:  No lower extremity edema/swelling. No midline cervical, thoracic, or lumbar tenderness.  Extremities without joint swelling or redness.  Skin:  Warm and dry, No rash or lesions noted. 2+ radial pulses BL, cap refill <seconds.  Neuro:  Alert and oriented. GCS 15 5/5 strength BL to all joints of upper and lower extremities.  Normal and symmetric sensation to touch BL in arms, legs, and face.  CNII-XII intact.  Normal finger to nose testing BL. Gait normal.  Psych: Awake. Alert. Normal affect. Appropriate interactions.      Diagnostics     Lab Results   Labs Ordered and Resulted from Time of ED Arrival to Time of ED Departure   BASIC METABOLIC PANEL - Abnormal       Result Value    Sodium 138      Potassium 5.2      Chloride 102      Carbon Dioxide (CO2) 22      Anion Gap 14      Urea Nitrogen 9.5      Creatinine 0.62      GFR Estimate >90      Calcium 9.2      Glucose 115 (*)    TROPONIN T, HIGH SENSITIVITY - Normal    Troponin T, High Sensitivity <6     D DIMER QUANTITATIVE - Normal    D-Dimer Quantitative 0.29     CBC WITH PLATELETS AND DIFFERENTIAL    WBC Count 5.8      RBC Count 4.34      Hemoglobin 13.4      Hematocrit 41.6      MCV 96 "      MCH 30.9      MCHC 32.2      RDW 13.2      Platelet Count 311      % Neutrophils 61      % Lymphocytes 30      % Monocytes 6      % Eosinophils 2      % Basophils 1      % Immature Granulocytes 0      NRBCs per 100 WBC 0      Absolute Neutrophils 3.5      Absolute Lymphocytes 1.8      Absolute Monocytes 0.3      Absolute Eosinophils 0.1      Absolute Basophils 0.1      Absolute Immature Granulocytes 0.0      Absolute NRBCs 0.0         Imaging   XR Shoulder Left G/E 3 Views   Final Result   IMPRESSION:       Negative for acute left shoulder fracture or dislocation. No definite degenerative changes. There is a small oval shaped density projecting in profile with the humeral head, seen only on the axillary view. The exact location of this is not entirely    certain but could reflect a small chronic ossicle or focus of calcific tendinitis. MR could better assess as clinically indicated.      Chest XR,  PA & LAT   Final Result   IMPRESSION: Lungs are clear. No pleural effusion. Heart size and pulmonary vascularity within normal limits. Cholecystectomy clips. No significant change.        ECG results from 10/13/24   EKG 12-lead, tracing only     Value    Systolic Blood Pressure     Diastolic Blood Pressure     Ventricular Rate 68    Atrial Rate 68    ND Interval 172    QRS Duration 78        QTc 440    P Axis 47    R AXIS 28    T Axis 41    Interpretation ECG      Sinus rhythm  Normal ECG  When compared with ECG of 04-Mar-2024 11:28, (unconfirmed)  Nonspecific T wave abnormality no longer evident in Anterior leads           Independent Interpretation   CXR: No pneumothorax, infiltrate, pleural effusion, cardiomegaly, or mediastinal widening.    ED Course      Medications Administered   Medications - No data to display    Procedures   Procedures     Discussion of Management   None    ED Course        Additional Documentation  None    Medical Decision Making / Diagnosis     CMS Diagnoses: None    MIPS        None    MDM   69 year old female who presents with chest pain.  Patient history and records reviewed. Broad differential considered including: ACS, PE, aortic dissection, myocarditis, pericarditis, pneumothorax, pneumonia, esophageal rupture, musculoskeletal chest wall pain, referred pain from abdomen.  Patient well appearing with non-concerning vitals.  EKG without evidence of acute ischemia, signs of pericarditis, or arrythmia. HS troponin is undetectable, and patient w/recent reassuring cardiac workup and I feel ACS or myocarditis is unlikely.  D dimer negative and low risk for PE by Well's criteria and would not pursue further workup at this time.  Chest x-ray shows no evidence of pneumonia, free air, pneumothorax, CHF, or mediastinal widening.  No other high risk factors present to suggest aortic dissection, and feel this is very unlikely at this time.  Abdominal examination non-tender and doubt referred pain from intra-abdominal catastrophe, pancreatitis, gallbladder pathology.      Reported some paresthesias to the arm but neurologic exam is objectively completely normal.  This is not a loss of feeling.  Had MRI with neurovascular angiography workup at prior similar visit that was reassuring.  I do not think this is consistent with stroke or TIA or compressive lesion in the cervical spine and I do not feel emergent MRI is indicated.  Suspect likely peripheral shoulder pain/possible component of radiculopathy but again strength is completely normal as is sensation.  X-ray of the shoulder is negative with the exception of possible findings of calcific tendinitis this area.  Neurovascularly intact no evidence of ischemic limb and no edema or signs of upper extremity DVT nothing to suggest septic joint skin or soft tissue infection etc.  I will try Medrol Dosepak for home.  I think the chest pain is most likely musculoskeletal in nature.  I will have her follow-up closely with the PCP she does not yet have care  performed but will establish this and I will also refer her to Ortho.  She will return if new worsening or changing symptoms develop and these were outlined.      Disposition   The patient was discharged.     Diagnosis     ICD-10-CM    1. Paresthesia of left arm  R20.2 Primary Care Referral      2. Atypical chest pain  R07.89 Primary Care Referral      3. Calcific tendinitis of left shoulder region  M75.32     suspected           Discharge Medications   Discharge Medication List as of 10/13/2024 11:31 AM        START taking these medications    Details   methylPREDNISolone (MEDROL DOSEPAK) 4 MG tablet therapy pack Follow Package Directions, Disp-21 tablet, R-0, E-Prescribe                  Matthew Sky PA-C  10/13/24 3213

## 2024-10-13 NOTE — ED TRIAGE NOTES
"Pt presents to the ED stating she has chest tightness and a \"dead feeling\" in her left arm since yesterday. Pt states the pain radiates to back and the base of her neck. Pt states she had an episode like this recently and she saw a cardiologist and everything was negative. No cardiac hx. Chest tightness 6/10.        "

## 2024-10-14 LAB
ATRIAL RATE - MUSE: 68 BPM
DIASTOLIC BLOOD PRESSURE - MUSE: NORMAL MMHG
INTERPRETATION ECG - MUSE: NORMAL
P AXIS - MUSE: 47 DEGREES
PR INTERVAL - MUSE: 172 MS
QRS DURATION - MUSE: 78 MS
QT - MUSE: 414 MS
QTC - MUSE: 440 MS
R AXIS - MUSE: 28 DEGREES
SYSTOLIC BLOOD PRESSURE - MUSE: NORMAL MMHG
T AXIS - MUSE: 41 DEGREES
VENTRICULAR RATE- MUSE: 68 BPM

## 2025-06-14 ENCOUNTER — HEALTH MAINTENANCE LETTER (OUTPATIENT)
Age: 70
End: 2025-06-14

## 2025-06-14 ENCOUNTER — APPOINTMENT (OUTPATIENT)
Dept: CT IMAGING | Facility: CLINIC | Age: 70
End: 2025-06-14
Attending: EMERGENCY MEDICINE
Payer: MEDICARE

## 2025-06-14 ENCOUNTER — HOSPITAL ENCOUNTER (EMERGENCY)
Facility: CLINIC | Age: 70
Discharge: HOME | End: 2025-06-14
Attending: EMERGENCY MEDICINE | Admitting: EMERGENCY MEDICINE
Payer: MEDICARE

## 2025-06-14 ENCOUNTER — APPOINTMENT (OUTPATIENT)
Dept: GENERAL RADIOLOGY | Facility: CLINIC | Age: 70
End: 2025-06-14
Attending: EMERGENCY MEDICINE
Payer: MEDICARE

## 2025-06-14 VITALS
WEIGHT: 168.21 LBS | HEART RATE: 64 BPM | HEIGHT: 63 IN | RESPIRATION RATE: 18 BRPM | OXYGEN SATURATION: 95 % | TEMPERATURE: 98.3 F | SYSTOLIC BLOOD PRESSURE: 144 MMHG | BODY MASS INDEX: 29.8 KG/M2 | DIASTOLIC BLOOD PRESSURE: 76 MMHG

## 2025-06-14 DIAGNOSIS — M79.10 MYALGIA: ICD-10-CM

## 2025-06-14 DIAGNOSIS — J06.9 VIRAL URI WITH COUGH: ICD-10-CM

## 2025-06-14 DIAGNOSIS — R51.9 NONINTRACTABLE HEADACHE, UNSPECIFIED CHRONICITY PATTERN, UNSPECIFIED HEADACHE TYPE: ICD-10-CM

## 2025-06-14 LAB
ALBUMIN SERPL BCG-MCNC: 4.1 G/DL (ref 3.5–5.2)
ALBUMIN UR-MCNC: NEGATIVE MG/DL
ALP SERPL-CCNC: 93 U/L (ref 40–150)
ALT SERPL W P-5'-P-CCNC: 37 U/L (ref 0–50)
ANION GAP SERPL CALCULATED.3IONS-SCNC: 14 MMOL/L (ref 7–15)
APPEARANCE UR: CLEAR
AST SERPL W P-5'-P-CCNC: 27 U/L (ref 0–45)
BASOPHILS # BLD AUTO: 0.1 10E3/UL (ref 0–0.2)
BASOPHILS NFR BLD AUTO: 0 %
BILIRUB SERPL-MCNC: 0.6 MG/DL
BILIRUB UR QL STRIP: NEGATIVE
BUN SERPL-MCNC: 9.8 MG/DL (ref 8–23)
CALCIUM SERPL-MCNC: 9.3 MG/DL (ref 8.8–10.4)
CHLORIDE SERPL-SCNC: 100 MMOL/L (ref 98–107)
CK SERPL-CCNC: 47 U/L (ref 26–192)
COLOR UR AUTO: ABNORMAL
CREAT SERPL-MCNC: 0.6 MG/DL (ref 0.51–0.95)
EGFRCR SERPLBLD CKD-EPI 2021: >90 ML/MIN/1.73M2
EOSINOPHIL # BLD AUTO: 0 10E3/UL (ref 0–0.7)
EOSINOPHIL NFR BLD AUTO: 0 %
ERYTHROCYTE [DISTWIDTH] IN BLOOD BY AUTOMATED COUNT: 13.2 % (ref 10–15)
FLUAV RNA SPEC QL NAA+PROBE: NEGATIVE
FLUBV RNA RESP QL NAA+PROBE: NEGATIVE
GLUCOSE SERPL-MCNC: 113 MG/DL (ref 70–99)
GLUCOSE UR STRIP-MCNC: NEGATIVE MG/DL
HCO3 SERPL-SCNC: 24 MMOL/L (ref 22–29)
HCT VFR BLD AUTO: 38.7 % (ref 35–47)
HGB BLD-MCNC: 12.9 G/DL (ref 11.7–15.7)
HGB UR QL STRIP: NEGATIVE
IMM GRANULOCYTES # BLD: 0.1 10E3/UL
IMM GRANULOCYTES NFR BLD: 1 %
KETONES UR STRIP-MCNC: NEGATIVE MG/DL
LEUKOCYTE ESTERASE UR QL STRIP: ABNORMAL
LYMPHOCYTES # BLD AUTO: 1.1 10E3/UL (ref 0.8–5.3)
LYMPHOCYTES NFR BLD AUTO: 7 %
MCH RBC QN AUTO: 31.7 PG (ref 26.5–33)
MCHC RBC AUTO-ENTMCNC: 33.3 G/DL (ref 31.5–36.5)
MCV RBC AUTO: 95 FL (ref 78–100)
MONOCYTES # BLD AUTO: 0.5 10E3/UL (ref 0–1.3)
MONOCYTES NFR BLD AUTO: 3 %
MUCOUS THREADS #/AREA URNS LPF: PRESENT /LPF
NEUTROPHILS # BLD AUTO: 13.8 10E3/UL (ref 1.6–8.3)
NEUTROPHILS NFR BLD AUTO: 89 %
NITRATE UR QL: NEGATIVE
NRBC # BLD AUTO: 0 10E3/UL
NRBC BLD AUTO-RTO: 0 /100
PH UR STRIP: 7.5 [PH] (ref 5–7)
PLATELET # BLD AUTO: 304 10E3/UL (ref 150–450)
POTASSIUM SERPL-SCNC: 4.8 MMOL/L (ref 3.4–5.3)
PROT SERPL-MCNC: 7.3 G/DL (ref 6.4–8.3)
RBC # BLD AUTO: 4.07 10E6/UL (ref 3.8–5.2)
RBC URINE: 1 /HPF
RSV RNA SPEC NAA+PROBE: NEGATIVE
SARS-COV-2 RNA RESP QL NAA+PROBE: NEGATIVE
SODIUM SERPL-SCNC: 138 MMOL/L (ref 135–145)
SP GR UR STRIP: 1.01 (ref 1–1.03)
SQUAMOUS EPITHELIAL: 1 /HPF
TROPONIN T SERPL HS-MCNC: <6 NG/L
UROBILINOGEN UR STRIP-MCNC: NORMAL MG/DL
WBC # BLD AUTO: 15.5 10E3/UL (ref 4–11)
WBC URINE: 1 /HPF

## 2025-06-14 PROCEDURE — 87086 URINE CULTURE/COLONY COUNT: CPT | Performed by: EMERGENCY MEDICINE

## 2025-06-14 PROCEDURE — 84484 ASSAY OF TROPONIN QUANT: CPT | Performed by: EMERGENCY MEDICINE

## 2025-06-14 PROCEDURE — 36415 COLL VENOUS BLD VENIPUNCTURE: CPT | Performed by: EMERGENCY MEDICINE

## 2025-06-14 PROCEDURE — 87637 SARSCOV2&INF A&B&RSV AMP PRB: CPT | Performed by: EMERGENCY MEDICINE

## 2025-06-14 PROCEDURE — 70450 CT HEAD/BRAIN W/O DYE: CPT

## 2025-06-14 PROCEDURE — 258N000003 HC RX IP 258 OP 636: Performed by: EMERGENCY MEDICINE

## 2025-06-14 PROCEDURE — 96360 HYDRATION IV INFUSION INIT: CPT

## 2025-06-14 PROCEDURE — 93005 ELECTROCARDIOGRAM TRACING: CPT

## 2025-06-14 PROCEDURE — 99285 EMERGENCY DEPT VISIT HI MDM: CPT | Mod: 25

## 2025-06-14 PROCEDURE — 250N000013 HC RX MED GY IP 250 OP 250 PS 637: Performed by: EMERGENCY MEDICINE

## 2025-06-14 PROCEDURE — 80053 COMPREHEN METABOLIC PANEL: CPT | Performed by: EMERGENCY MEDICINE

## 2025-06-14 PROCEDURE — 81001 URINALYSIS AUTO W/SCOPE: CPT | Performed by: EMERGENCY MEDICINE

## 2025-06-14 PROCEDURE — 85025 COMPLETE CBC W/AUTO DIFF WBC: CPT | Performed by: EMERGENCY MEDICINE

## 2025-06-14 PROCEDURE — 71046 X-RAY EXAM CHEST 2 VIEWS: CPT

## 2025-06-14 PROCEDURE — 96361 HYDRATE IV INFUSION ADD-ON: CPT

## 2025-06-14 PROCEDURE — 82550 ASSAY OF CK (CPK): CPT | Performed by: EMERGENCY MEDICINE

## 2025-06-14 RX ORDER — IBUPROFEN 800 MG/1
800 TABLET, FILM COATED ORAL ONCE
Status: COMPLETED | OUTPATIENT
Start: 2025-06-14 | End: 2025-06-14

## 2025-06-14 RX ORDER — ACETAMINOPHEN 500 MG
1000 TABLET ORAL ONCE
Status: COMPLETED | OUTPATIENT
Start: 2025-06-14 | End: 2025-06-14

## 2025-06-14 RX ADMIN — IBUPROFEN 800 MG: 800 TABLET, FILM COATED ORAL at 18:36

## 2025-06-14 RX ADMIN — ACETAMINOPHEN 1000 MG: 500 TABLET ORAL at 18:36

## 2025-06-14 RX ADMIN — SODIUM CHLORIDE 1000 ML: 0.9 INJECTION, SOLUTION INTRAVENOUS at 18:38

## 2025-06-14 ASSESSMENT — ACTIVITIES OF DAILY LIVING (ADL)
ADLS_ACUITY_SCORE: 41

## 2025-06-14 NOTE — ED PROVIDER NOTES
"  Emergency Department Note      History of Present Illness     Chief Complaint   Headache      HPI   Cher Pugh is a 69 year old female with history of hypertension who presents to the ED with headache. Patient reports headache on the right side and muscle soreness in her upper arms, left neck, shins, and lower back that all began yesterday afternoon. She has a little cough and felt warm but denies fever. She lives with three kids all under 10 who have been sick recently with vomiting, tiredness, and leg pain. She has not taken anything for the pain today. She denies head trauma, vision changes, eye pain, chest pain, vomiting, diarrhea, sore throat, runny nose, or dysuria. She does not have trouble walking or have increased confusion. Denies recent travel or surgeries. She has a history of migraines but has not had one for 30 years. She has been more sedentary recently due to the weather.       Independent Historian   None    Review of External Notes       Past Medical History     Medical History and Problem List   Hypertension     Medications   Losartan     Surgical History   The patient has no pertinent past surgical history     Physical Exam     Patient Vitals for the past 24 hrs:   BP Temp Temp src Pulse Resp SpO2 Height Weight   06/14/25 1742 (!) 154/83 98.3  F (36.8  C) Temporal 93 18 97 % 1.588 m (5' 2.5\") 76.3 kg (168 lb 3.4 oz)     Physical Exam  Constitutional: Well appearing.  HEENT: Atraumatic.  PERRL.  EOMI.  Moist mucous membranes.  Neck: Soft.  Supple.  No JVD.  Cardiac: Regular rate and rhythm.  No murmur or rub.  Radial pulses are 2+ and symmetric.    Respiratory: Clear to auscultation bilaterally.  No respiratory distress.  No wheezing, rhonchi, or rales.  Abdomen: Soft and nontender.  No rebound or guarding.  Nondistended.  Musculoskeletal: No edema.  Normal range of motion.  No visible abnormality of the lower extremities.  No swelling.Compartments are soft and fleshy.  Neurologic: Alert " and oriented x3.  Normal tone and bulk.  No facial drooping.  Normal speech.  5/5 strength in bilateral upper and lower extremities.  Sensation to light touch intact throughout.  Normal gait.  Skin: No rashes.  No edema.  Psych: Normal affect.  Normal behavior.            Diagnostics     Lab Results   Labs Ordered and Resulted from Time of ED Arrival to Time of ED Departure   COMPREHENSIVE METABOLIC PANEL - Abnormal       Result Value    Sodium 138      Potassium 4.8      Carbon Dioxide (CO2) 24      Anion Gap 14      Urea Nitrogen 9.8      Creatinine 0.60      GFR Estimate >90      Calcium 9.3      Chloride 100      Glucose 113 (*)     Alkaline Phosphatase 93      AST 27      ALT 37      Protein Total 7.3      Albumin 4.1      Bilirubin Total 0.6     ROUTINE UA WITH MICROSCOPIC REFLEX TO CULTURE - Abnormal    Color Urine Light Yellow      Appearance Urine Clear      Glucose Urine Negative      Bilirubin Urine Negative      Ketones Urine Negative      Specific Gravity Urine 1.014      Blood Urine Negative      pH Urine 7.5 (*)     Protein Albumin Urine Negative      Urobilinogen Urine Normal      Nitrite Urine Negative      Leukocyte Esterase Urine Moderate (*)     Mucus Urine Present (*)     RBC Urine 1      WBC Urine 1      Squamous Epithelials Urine 1     CBC WITH PLATELETS AND DIFFERENTIAL - Abnormal    WBC Count 15.5 (*)     RBC Count 4.07      Hemoglobin 12.9      Hematocrit 38.7      MCV 95      MCH 31.7      MCHC 33.3      RDW 13.2      Platelet Count 304      % Neutrophils 89      % Lymphocytes 7      % Monocytes 3      % Eosinophils 0      % Basophils 0      % Immature Granulocytes 1      NRBCs per 100 WBC 0      Absolute Neutrophils 13.8 (*)     Absolute Lymphocytes 1.1      Absolute Monocytes 0.5      Absolute Eosinophils 0.0      Absolute Basophils 0.1      Absolute Immature Granulocytes 0.1      Absolute NRBCs 0.0     TROPONIN T, HIGH SENSITIVITY - Normal    Troponin T, High Sensitivity <6      INFLUENZA A/B, RSV AND SARS-COV2 PCR - Normal    Influenza A PCR Negative      Influenza B PCR Negative      RSV PCR Negative      SARS CoV2 PCR Negative     CK TOTAL - Normal    CK 47     URINE CULTURE       Imaging   XR Chest 2 Views   Final Result   IMPRESSION: No pleural fluid or pneumothorax. No airspace disease or edema. Normal size of the heart. Cholecystectomy clips are present.      CT Head w/o Contrast   Final Result   IMPRESSION:   1.  No evidence of acute intracranial abnormality.          EKG   ECG taken at 1908, ECG read at 2000  Normal sinus rhythm   No significant change as compared to prior, dated 10/13/24.  Rate 77 bpm. NC interval 172 ms. QRS duration 78 ms. QT/QTc 396/448 ms. P-R-T axes 10 26 17.    Independent Interpretation   CXR: No infiltrate.  CT Head: No intracranial hemorrhage.    ED Course      Medications Administered   Medications   sodium chloride 0.9% BOLUS 1,000 mL (0 mLs Intravenous Stopped 6/14/25 2106)   acetaminophen (TYLENOL) tablet 1,000 mg (1,000 mg Oral $Given 6/14/25 1836)   ibuprofen (ADVIL/MOTRIN) tablet 800 mg (800 mg Oral $Given 6/14/25 1836)       Procedures   None     Discussion of Management   None    ED Course   ED Course as of 06/14/25 2108   Sat Jun 14, 2025 1813 I obtained history and examined the patient as noted above.        Additional Documentation  None    Medical Decision Making / Diagnosis     CMS Diagnoses: None    MIPS   None               [facility]   Cher Pugh is a 69 year old female who is afebrile and hemodynamically stable.  He is neurologically intact with no focal deficits.  She is having pain in the bilateral upper arms and bilateral shins but has no numbness or weakness and has a normal neurologic exam and no indication for an MRI.  She is having a headache and a CT scan was undertaken which revealed no evidence of acute abnormality.  She has no fever or meningismus my concern for meningitis/encephalitis is exceedingly low.  Lab workup as  noted as above.  We discussed potential viral illness as she has a slight cough and he has what appear to be body aches.  She is neurologically intact and I think safe for discharge home.  Chest x-ray without pneumonia.  We discussed supportive care at home and strict return precautions were given.  I stressed the need for return for any neurologic changes and she is understanding.  She otherwise follow-up with primary care physician.  She will utilize Tylenol ibuprofen and supportive care.  She was in no distress at time of discharge.    Disposition   The patient was discharged.     Diagnosis     ICD-10-CM    1. Nonintractable headache, unspecified chronicity pattern, unspecified headache type  R51.9       2. Myalgia  M79.10       3. Viral URI with cough  J06.9            Discharge Medications   New Prescriptions    No medications on file         Scribe Disclosure:  Henrry CASILLAS, am serving as a scribe at 8:08 PM on 6/14/2025 to document services personally performed by Gilles Zamora MD based on my observations and the provider's statements to me.        Gilles Zamora MD  06/15/25 6099

## 2025-06-14 NOTE — ED TRIAGE NOTES
"Pt presents for evaluation of a headache. Headache started yesterday, right temporal region. Hasn't tried anything for pain. No hx of headaches. Also c/o bilateral upper arm numbness,  \"doesn't feel the best\", hard to put in to words, \"shin splints\" sensation in BLE, left sided neck pain and left lower back pain. Symptoms started yesterday around 1594-3058. Has had recent stress. Denies vision changes.         "

## 2025-06-15 LAB — BACTERIA UR CULT: NORMAL

## 2025-06-16 LAB
ATRIAL RATE - MUSE: 77 BPM
DIASTOLIC BLOOD PRESSURE - MUSE: NORMAL MMHG
INTERPRETATION ECG - MUSE: NORMAL
P AXIS - MUSE: 10 DEGREES
PR INTERVAL - MUSE: 172 MS
QRS DURATION - MUSE: 78 MS
QT - MUSE: 396 MS
QTC - MUSE: 448 MS
R AXIS - MUSE: 26 DEGREES
SYSTOLIC BLOOD PRESSURE - MUSE: NORMAL MMHG
T AXIS - MUSE: 17 DEGREES
VENTRICULAR RATE- MUSE: 77 BPM

## 2025-07-09 ENCOUNTER — HOSPITAL ENCOUNTER (EMERGENCY)
Facility: CLINIC | Age: 70
Discharge: HOME | End: 2025-07-09
Attending: EMERGENCY MEDICINE
Payer: MEDICARE

## 2025-07-09 ENCOUNTER — APPOINTMENT (OUTPATIENT)
Dept: CT IMAGING | Facility: CLINIC | Age: 70
End: 2025-07-09
Attending: EMERGENCY MEDICINE
Payer: MEDICARE

## 2025-07-09 VITALS
SYSTOLIC BLOOD PRESSURE: 156 MMHG | HEIGHT: 62 IN | DIASTOLIC BLOOD PRESSURE: 83 MMHG | BODY MASS INDEX: 30.26 KG/M2 | WEIGHT: 164.46 LBS | OXYGEN SATURATION: 98 % | HEART RATE: 80 BPM | RESPIRATION RATE: 20 BRPM | TEMPERATURE: 98.1 F

## 2025-07-09 DIAGNOSIS — R10.9 RIGHT FLANK PAIN: ICD-10-CM

## 2025-07-09 DIAGNOSIS — N81.10 VAGINAL PROLAPSE: ICD-10-CM

## 2025-07-09 LAB
ALBUMIN SERPL BCG-MCNC: 4.6 G/DL (ref 3.5–5.2)
ALBUMIN UR-MCNC: NEGATIVE MG/DL
ALP SERPL-CCNC: 91 U/L (ref 40–150)
ALT SERPL W P-5'-P-CCNC: 24 U/L (ref 0–50)
ANION GAP SERPL CALCULATED.3IONS-SCNC: 11 MMOL/L (ref 7–15)
APPEARANCE UR: CLEAR
AST SERPL W P-5'-P-CCNC: 20 U/L (ref 0–45)
BASOPHILS # BLD AUTO: 0.1 10E3/UL (ref 0–0.2)
BASOPHILS NFR BLD AUTO: 1 %
BILIRUB SERPL-MCNC: 0.4 MG/DL
BILIRUB UR QL STRIP: NEGATIVE
BUN SERPL-MCNC: 9.6 MG/DL (ref 8–23)
CALCIUM SERPL-MCNC: 9.5 MG/DL (ref 8.8–10.4)
CHLORIDE SERPL-SCNC: 103 MMOL/L (ref 98–107)
COLOR UR AUTO: ABNORMAL
CREAT SERPL-MCNC: 0.65 MG/DL (ref 0.51–0.95)
EGFRCR SERPLBLD CKD-EPI 2021: >90 ML/MIN/1.73M2
EOSINOPHIL # BLD AUTO: 0 10E3/UL (ref 0–0.7)
EOSINOPHIL NFR BLD AUTO: 1 %
ERYTHROCYTE [DISTWIDTH] IN BLOOD BY AUTOMATED COUNT: 13.2 % (ref 10–15)
GLUCOSE SERPL-MCNC: 107 MG/DL (ref 70–99)
GLUCOSE UR STRIP-MCNC: NEGATIVE MG/DL
HCO3 SERPL-SCNC: 24 MMOL/L (ref 22–29)
HCT VFR BLD AUTO: 38 % (ref 35–47)
HGB BLD-MCNC: 12.6 G/DL (ref 11.7–15.7)
HGB UR QL STRIP: NEGATIVE
HOLD SPECIMEN: NORMAL
IMM GRANULOCYTES # BLD: 0 10E3/UL
IMM GRANULOCYTES NFR BLD: 0 %
KETONES UR STRIP-MCNC: NEGATIVE MG/DL
LEUKOCYTE ESTERASE UR QL STRIP: ABNORMAL
LYMPHOCYTES # BLD AUTO: 1.9 10E3/UL (ref 0.8–5.3)
LYMPHOCYTES NFR BLD AUTO: 35 %
MCH RBC QN AUTO: 31.7 PG (ref 26.5–33)
MCHC RBC AUTO-ENTMCNC: 33.2 G/DL (ref 31.5–36.5)
MCV RBC AUTO: 96 FL (ref 78–100)
MONOCYTES # BLD AUTO: 0.4 10E3/UL (ref 0–1.3)
MONOCYTES NFR BLD AUTO: 7 %
MUCOUS THREADS #/AREA URNS LPF: PRESENT /LPF
NEUTROPHILS # BLD AUTO: 3.1 10E3/UL (ref 1.6–8.3)
NEUTROPHILS NFR BLD AUTO: 57 %
NITRATE UR QL: NEGATIVE
NRBC # BLD AUTO: 0 10E3/UL
NRBC BLD AUTO-RTO: 0 /100
PH UR STRIP: 6 [PH] (ref 5–7)
PLATELET # BLD AUTO: 330 10E3/UL (ref 150–450)
POTASSIUM SERPL-SCNC: 4.2 MMOL/L (ref 3.4–5.3)
PROT SERPL-MCNC: 7.2 G/DL (ref 6.4–8.3)
RBC # BLD AUTO: 3.98 10E6/UL (ref 3.8–5.2)
RBC URINE: 1 /HPF
SODIUM SERPL-SCNC: 138 MMOL/L (ref 135–145)
SP GR UR STRIP: 1.01 (ref 1–1.03)
SQUAMOUS EPITHELIAL: 1 /HPF
UROBILINOGEN UR STRIP-MCNC: NORMAL MG/DL
WBC # BLD AUTO: 5.4 10E3/UL (ref 4–11)
WBC URINE: 2 /HPF

## 2025-07-09 PROCEDURE — 85004 AUTOMATED DIFF WBC COUNT: CPT | Performed by: EMERGENCY MEDICINE

## 2025-07-09 PROCEDURE — 81001 URINALYSIS AUTO W/SCOPE: CPT | Performed by: EMERGENCY MEDICINE

## 2025-07-09 PROCEDURE — 74176 CT ABD & PELVIS W/O CONTRAST: CPT

## 2025-07-09 PROCEDURE — 99284 EMERGENCY DEPT VISIT MOD MDM: CPT | Mod: 25 | Performed by: EMERGENCY MEDICINE

## 2025-07-09 PROCEDURE — 36415 COLL VENOUS BLD VENIPUNCTURE: CPT | Performed by: EMERGENCY MEDICINE

## 2025-07-09 PROCEDURE — 87086 URINE CULTURE/COLONY COUNT: CPT | Performed by: EMERGENCY MEDICINE

## 2025-07-09 PROCEDURE — 80053 COMPREHEN METABOLIC PANEL: CPT | Performed by: EMERGENCY MEDICINE

## 2025-07-09 ASSESSMENT — ACTIVITIES OF DAILY LIVING (ADL)
ADLS_ACUITY_SCORE: 41
ADLS_ACUITY_SCORE: 41

## 2025-07-09 ASSESSMENT — COLUMBIA-SUICIDE SEVERITY RATING SCALE - C-SSRS
2. HAVE YOU ACTUALLY HAD ANY THOUGHTS OF KILLING YOURSELF IN THE PAST MONTH?: NO
1. IN THE PAST MONTH, HAVE YOU WISHED YOU WERE DEAD OR WISHED YOU COULD GO TO SLEEP AND NOT WAKE UP?: NO
6. HAVE YOU EVER DONE ANYTHING, STARTED TO DO ANYTHING, OR PREPARED TO DO ANYTHING TO END YOUR LIFE?: NO

## 2025-07-09 NOTE — DISCHARGE INSTRUCTIONS
Please follow up with OB/GYN. You can call their office to schedule an appointment.    Discharge Instructions  Abdominal Pain    Abdominal pain (belly pain) can be caused by many things. Your evaluation today does not show the exact cause for your pain. Your provider today has decided that it is unlikely your pain is due to a life threatening problem, or a problem requiring surgery or hospital admission. Sometimes those problems cannot be found right away, so it is very important that you follow up as directed.  Sometimes only the changes which occur over time allow the cause of your pain to be found.    Generally, every Emergency Department visit should have a follow-up clinic visit with either a primary or a specialty clinic/provider. Please follow-up as instructed by your emergency provider today. With abdominal pain, we often recommend very close follow-up, such as the following day.    ADULTS:  Return to the Emergency Department right away if:    You get an oral temperature above 102oF or as directed by your provider.  You have blood in your stools. This may be bright red or appear as black, tarry stools.    You keep vomiting (throwing up) or cannot drink liquids.  You see blood when you vomit.   You cannot have a bowel movement or you cannot pass gas.  Your stomach gets bloated or bigger.  Your skin or the whites of your eyes look yellow.  You faint.  You have bloody, frequent or painful urination (peeing).  You have new symptoms or anything that worries you.    CHILDREN:  Return to the Emergency Department right away if your child has any of the above-listed symptoms or the following:    Pushes your hand away or screams/cries when his/her belly is touched.  You notice your child is very fussy or weak.  Your child is very tired and is too tired to eat or drink.  Your child is dehydrated.  Signs of dehydration can be:  Significant change in the amount of wet diapers/urine.  Your infant or child starts to have  dry mouth and lips, or no saliva (spit) or tears.    PREGNANT WOMEN:  Return to the Emergency Department right away if you have any of the above-listed symptoms or the following:    You have bleeding, leaking fluid or passing tissue from the vagina.  You have worse pain or cramping, or pain in your shoulder or back.  You have vomiting that will not stop.  You have a temperature of 100oF or more.  Your baby is not moving as much as usual.  You faint.  You get a bad headache with or without eye problems and abdominal pain.  You have a seizure.  You have unusual discharge from your vagina and abdominal pain.    Abdominal pain is pretty common during pregnancy.  Your pain may or may not be related to your pregnancy. You should follow-up closely with your OB provider so they can evaluate you and your baby.  Until you follow-up with your regular provider, do the following:     Avoid sex and do not put anything in your vagina.  Drink clear fluids.  Only take medications approved by your provider.    MORE INFORMATION:    Appendicitis:  A possible cause of abdominal pain in any person who still has their appendix is acute appendicitis. Appendicitis is often hard to diagnose.  Testing does not always rule out early appendicitis or other causes of abdominal pain. Close follow-up with your provider and re-evaluations may be needed to figure out the reason for your abdominal pain.    Follow-up:  It is very important that you make an appointment with your clinic and go to the appointment.  If you do not follow-up with your primary provider, it may result in missing an important development which could result in permanent injury or disability and/or lasting pain.  If there is any problem keeping your appointment, call your provider or return to the Emergency Department.    Medications:  Take your medications as directed by your provider today.  Before using over-the-counter medications, ask your provider and make sure to take the  "medications as directed.  If you have any questions about medications, ask your provider.    Diet:  Resume your normal diet as much as possible, but do not eat fried, fatty or spicy foods while you have pain.  Do not drink alcohol or have caffeine.  Do not smoke tobacco.    Probiotics: If you have been given an antibiotic, you may want to also take a probiotic pill or eat yogurt with live cultures. Probiotics have \"good bacteria\" to help your intestines stay healthy. Studies have shown that probiotics help prevent diarrhea (loose stools) and other intestine problems (including C. diff infection) when you take antibiotics. You can buy these without a prescription in the pharmacy section of the store.     If you were given a prescription for medicine here today, be sure to read all of the information (including the package insert) that comes with your prescription.  This will include important information about the medicine, its side effects, and any warnings that you need to know about.  The pharmacist who fills the prescription can provide more information and answer questions you may have about the medicine.  If you have questions or concerns that the pharmacist cannot address, please call or return to the Emergency Department.       Remember that you can always come back to the Emergency Department if you are not able to see your regular provider in the amount of time listed above, if you get any new symptoms, or if there is anything that worries you.   "

## 2025-07-09 NOTE — ED PROVIDER NOTES
"  Emergency Department Note      History of Present Illness     Chief Complaint   Back Pain      HPI   Cher Pugh is a 69 year old female with hypertension who presents to the ED for evaluation of back pain. The patient reports that starting yesterday, she has been experiencing lower back pain that radiates to the right side of her abdomen/pelvic area. She describes the pain as 7/10. Moving alleviates the pain while sitting makes it worse. She has not taken tylenol or ibuprofen today. Pressure does not worsen the pain. She denies fever, vomiting, diarrhea, blood in her stool, or dysuria. Additionally she has never had a kidney stone before. Of note, she began treatment for hypertension starting May of 2025.    Independent Historian   None    Review of External Notes   None.    Past Medical History     Medical History and Problem List   Hypertension    Medications   Cozaar  Medrol dosepak    Surgical History   Cholecystectomy  Cyst removal    Physical Exam     Patient Vitals for the past 24 hrs:   BP Temp Temp src Pulse Resp SpO2 Height Weight   07/09/25 1634 (!) 156/83 -- -- -- -- -- -- --   07/09/25 1633 -- -- -- -- 20 98 % -- --   07/09/25 1433 (!) 175/98 98.1  F (36.7  C) Temporal 80 20 97 % 1.575 m (5' 2\") 74.6 kg (164 lb 7.4 oz)     Physical Exam  General: Well-nourished, resting comfortably when I enter the room  Eyes: Pupils equal, conjunctivae pink no scleral icterus or conjunctival injection  ENT:  Moist mucus membranes  Respiratory:  Lungs clear to auscultation bilaterally, no crackles/rubs/wheezes.  Good air movement  CV: Normal rate and rhythm, no murmurs  GI:  Abdomen soft and non-distended.  No tenderness, guarding or rebound  Skin: Warm, dry.  No rashes or petechiae  Musculoskeletal: No peripheral edema or calf tenderness  Neuro: Alert and oriented to person/place/time  Psychiatric: Normal affect    Diagnostics     Lab Results   Labs Ordered and Resulted from Time of ED Arrival to Time of ED " Departure   COMPREHENSIVE METABOLIC PANEL - Abnormal       Result Value    Sodium 138      Potassium 4.2      Carbon Dioxide (CO2) 24      Anion Gap 11      Urea Nitrogen 9.6      Creatinine 0.65      GFR Estimate >90      Calcium 9.5      Chloride 103      Glucose 107 (*)     Alkaline Phosphatase 91      AST 20      ALT 24      Protein Total 7.2      Albumin 4.6      Bilirubin Total 0.4     ROUTINE UA WITH MICROSCOPIC REFLEX TO CULTURE - Abnormal    Color Urine Straw      Appearance Urine Clear      Glucose Urine Negative      Bilirubin Urine Negative      Ketones Urine Negative      Specific Gravity Urine 1.007      Blood Urine Negative      pH Urine 6.0      Protein Albumin Urine Negative      Urobilinogen Urine Normal      Nitrite Urine Negative      Leukocyte Esterase Urine Moderate (*)     Mucus Urine Present (*)     RBC Urine 1      WBC Urine 2      Squamous Epithelials Urine 1     CBC WITH PLATELETS AND DIFFERENTIAL    WBC Count 5.4      RBC Count 3.98      Hemoglobin 12.6      Hematocrit 38.0      MCV 96      MCH 31.7      MCHC 33.2      RDW 13.2      Platelet Count 330      % Neutrophils 57      % Lymphocytes 35      % Monocytes 7      % Eosinophils 1      % Basophils 1      % Immature Granulocytes 0      NRBCs per 100 WBC 0      Absolute Neutrophils 3.1      Absolute Lymphocytes 1.9      Absolute Monocytes 0.4      Absolute Eosinophils 0.0      Absolute Basophils 0.1      Absolute Immature Granulocytes 0.0      Absolute NRBCs 0.0     URINE CULTURE       Imaging   CT Abdomen Pelvis w/o Contrast   Final Result   IMPRESSION:    1.  Pelvic floor descent with vaginal prolapse and small cystocele.   2.  New slight prominence of both upper urinary collecting systems without evidence of an obstructing lesion on this noncontrast exam. This may be secondary to the pelvic floor descent.   3.  Otherwise, no acute abdominopelvic abnormality.   4.  Normal appendix.              Independent Interpretation   None    ED  Course      Medications Administered   Medications - No data to display    Procedures   Procedures     Discussion of Management   None    ED Course   ED Course as of 07/09/25 1759   Wed Jul 09, 2025   1958 I obtained history and examined the patient as noted above     1620 I rechecked the patient and patient was aware of her pelvic prolapse and declined examination.       Additional Documentation  None    Medical Decision Making / Diagnosis     CMS Diagnoses: None    MIPS   None               [facility]   Cher Pugh is a 69 year old female with a history of pelvic floor dysfunction and hypertension presents to the emergency department with a complaint of right flank pain.  On exam patient is not in any acute distress.  She does have some CVA tenderness on the right.  Abdomen is soft and nontender.  No peritoneal signs.  Vital signs are all within acceptable limits.  Workup is overall reassuring, no signs of infection.  No signs of kidney stone, bowel obstruction, abscess.  It does show vaginal prolapse and a small cystocele.  Patient denies any pelvic pain.  Patient reports that she knows about the prolapse and this is a chronic problem for her.  She does not follow with OB/GYN.  She states that when she reduces it, it just comes back out.  She does not want a pelvic exam today.  She states this is a known ongoing problem for her.  She is not having any pain with the prolapse.  I will refer her to OB/GYN for further follow-up.  She is urinating like normal.  Patient refuses any pain medication.  She states that she will follow-up with her primary care physician as well as OB/GYN.  Patient is discharged home.    Disposition   The patient was discharged.     Diagnosis     ICD-10-CM    1. Right flank pain  R10.9       2. Vaginal prolapse  N81.10            Discharge Medications   Discharge Medication List as of 7/9/2025  4:29 PM            Scribe Disclosure:  I, Talib Foster, am serving as a scribe at 3:32 PM on 7/9/2025  to document services personally performed by Tashia Herron MD based on my observations and the provider's statements to me.     I, Marifer Fuentes, am serving as a scribe  at 3:32 PM on 7/9/2025 to document services personally performed by Tashia Herron MD based on my observations and the provider's statements to me.        Tashia Herron MD  07/09/25 [id number]

## 2025-07-09 NOTE — ED TRIAGE NOTES
Pt c/o right back pain since yesterday. Pain started in back, low thoracic and radiates to front- then goes across her body. Pt denies nausea, diarrhea, dysuria. No other symptoms.    Triage Assessment (Adult)       Row Name 07/09/25 1434          Triage Assessment    Airway WDL WDL        Respiratory WDL    Respiratory WDL WDL        Skin Circulation/Temperature WDL    Skin Circulation/Temperature WDL WDL        Cardiac WDL    Cardiac WDL WDL        Peripheral/Neurovascular WDL    Peripheral Neurovascular WDL WDL        Cognitive/Neuro/Behavioral WDL    Cognitive/Neuro/Behavioral WDL WDL

## 2025-07-10 LAB — BACTERIA UR CULT: NORMAL
